# Patient Record
Sex: FEMALE | Race: WHITE | NOT HISPANIC OR LATINO | Employment: FULL TIME | URBAN - METROPOLITAN AREA
[De-identification: names, ages, dates, MRNs, and addresses within clinical notes are randomized per-mention and may not be internally consistent; named-entity substitution may affect disease eponyms.]

---

## 2021-10-15 ENCOUNTER — APPOINTMENT (OUTPATIENT)
Dept: CT IMAGING | Facility: HOSPITAL | Age: 46
End: 2021-10-15
Payer: COMMERCIAL

## 2021-10-15 ENCOUNTER — HOSPITAL ENCOUNTER (EMERGENCY)
Facility: HOSPITAL | Age: 46
Discharge: HOME | End: 2021-10-15
Attending: EMERGENCY MEDICINE
Payer: COMMERCIAL

## 2021-10-15 ENCOUNTER — APPOINTMENT (OUTPATIENT)
Dept: RADIOLOGY | Facility: HOSPITAL | Age: 46
End: 2021-10-15
Payer: COMMERCIAL

## 2021-10-15 VITALS
HEART RATE: 102 BPM | RESPIRATION RATE: 10 BRPM | OXYGEN SATURATION: 97 % | SYSTOLIC BLOOD PRESSURE: 155 MMHG | TEMPERATURE: 98.1 F | WEIGHT: 127 LBS | DIASTOLIC BLOOD PRESSURE: 100 MMHG | BODY MASS INDEX: 23.37 KG/M2 | HEIGHT: 62 IN

## 2021-10-15 DIAGNOSIS — F41.9 ANXIETY: ICD-10-CM

## 2021-10-15 DIAGNOSIS — R00.0 TACHYCARDIA: ICD-10-CM

## 2021-10-15 DIAGNOSIS — R07.9 CHEST PAIN: Primary | ICD-10-CM

## 2021-10-15 LAB
ALBUMIN SERPL-MCNC: 5.1 G/DL (ref 3.5–5.3)
ALP SERPL-CCNC: 58 U/L (ref 37–98)
ALT SERPL-CCNC: 12 U/L (ref 7–52)
ANION GAP SERPL CALC-SCNC: 11 MMOL/L (ref 3–11)
AST SERPL-CCNC: 21 U/L
BASOPHILS # BLD AUTO: 0 10*3/UL
BASOPHILS NFR BLD AUTO: 1 % (ref 0–2)
BILIRUB SERPL-MCNC: 0.58 MG/DL (ref 0.2–1.4)
BUN SERPL-MCNC: 14 MG/DL (ref 7–25)
CALCIUM ALBUM COR SERPL-MCNC: 9.4 MG/DL (ref 8.6–10.3)
CALCIUM SERPL-MCNC: 10.3 MG/DL (ref 8.6–10.3)
CHLORIDE SERPL-SCNC: 106 MMOL/L (ref 98–107)
CO2 SERPL-SCNC: 23 MMOL/L (ref 21–32)
CREAT SERPL-MCNC: 0.84 MG/DL (ref 0.6–1.1)
DELTA HIGH SENSITIVITY TROPONIN I, 1 HOUR: -5
DELTA HIGH SENSITIVITY TROPONIN I, 2 HOUR: -4.6
EOSINOPHIL # BLD AUTO: 0 10*3/UL
EOSINOPHIL NFR BLD AUTO: 1 % (ref 0–3)
ERYTHROCYTE [DISTWIDTH] IN BLOOD BY AUTOMATED COUNT: 13.4 % (ref 11.5–14)
FIBRIN D-DIMER (NG/ML) IN PLATELET POOR PLASMA: 674 NG/ML FEU
GFR SERPL CREATININE-BSD FRML MDRD: 84 ML/MIN/1.73M*2
GLUCOSE SERPL-MCNC: 107 MG/DL (ref 70–105)
HCG SERPL QL: NEGATIVE
HCT VFR BLD AUTO: 45.9 % (ref 34–45)
HGB BLD-MCNC: 15.5 G/DL (ref 11.5–15.5)
LYMPHOCYTES # BLD AUTO: 1 10*3/UL
LYMPHOCYTES NFR BLD AUTO: 17 % (ref 11–47)
MAGNESIUM SERPL-MCNC: 2.2 MG/DL (ref 1.8–2.4)
MCH RBC QN AUTO: 29.7 PG (ref 28–33)
MCHC RBC AUTO-ENTMCNC: 33.8 G/DL (ref 32–36)
MCV RBC AUTO: 87.8 FL (ref 81–97)
MONOCYTES # BLD AUTO: 0.4 10*3/UL
MONOCYTES NFR BLD AUTO: 6 % (ref 3–11)
NEUTROPHILS # BLD AUTO: 4.5 10*3/UL
NEUTROPHILS NFR BLD AUTO: 76 % (ref 41–81)
PLATELET # BLD AUTO: 251 10*3/UL (ref 140–350)
PMV BLD AUTO: 7.3 FL (ref 6.9–10.8)
POTASSIUM SERPL-SCNC: 3.7 MMOL/L (ref 3.5–5.1)
PROT SERPL-MCNC: 8.4 G/DL (ref 6–8.3)
RBC # BLD AUTO: 5.22 10*6/ΜL (ref 3.7–5.3)
SODIUM SERPL-SCNC: 140 MMOL/L (ref 135–145)
TROPONIN I SERPL-MCNC: 10.3 PG/ML
TROPONIN I SERPL-MCNC: 14.9 PG/ML
TROPONIN I SERPL-MCNC: 9.9 PG/ML
WBC # BLD AUTO: 6 10*3/UL (ref 4.5–10.5)

## 2021-10-15 PROCEDURE — 84703 CHORIONIC GONADOTROPIN ASSAY: CPT | Performed by: EMERGENCY MEDICINE

## 2021-10-15 PROCEDURE — 85379 FIBRIN DEGRADATION QUANT: CPT | Performed by: EMERGENCY MEDICINE

## 2021-10-15 PROCEDURE — 2550000100 HC RX 255: Performed by: EMERGENCY MEDICINE

## 2021-10-15 PROCEDURE — 96374 THER/PROPH/DIAG INJ IV PUSH: CPT

## 2021-10-15 PROCEDURE — 93005 ELECTROCARDIOGRAM TRACING: CPT

## 2021-10-15 PROCEDURE — 84484 ASSAY OF TROPONIN QUANT: CPT | Performed by: EMERGENCY MEDICINE

## 2021-10-15 PROCEDURE — G1004 CDSM NDSC: HCPCS

## 2021-10-15 PROCEDURE — 71045 X-RAY EXAM CHEST 1 VIEW: CPT

## 2021-10-15 PROCEDURE — 2580000300 HC RX 258: Performed by: EMERGENCY MEDICINE

## 2021-10-15 PROCEDURE — 80053 COMPREHEN METABOLIC PANEL: CPT | Performed by: EMERGENCY MEDICINE

## 2021-10-15 PROCEDURE — 85025 COMPLETE CBC W/AUTO DIFF WBC: CPT | Performed by: EMERGENCY MEDICINE

## 2021-10-15 PROCEDURE — 99284 EMERGENCY DEPT VISIT MOD MDM: CPT | Performed by: EMERGENCY MEDICINE

## 2021-10-15 PROCEDURE — 83735 ASSAY OF MAGNESIUM: CPT | Performed by: EMERGENCY MEDICINE

## 2021-10-15 PROCEDURE — 96375 TX/PRO/DX INJ NEW DRUG ADDON: CPT

## 2021-10-15 PROCEDURE — 36415 COLL VENOUS BLD VENIPUNCTURE: CPT | Performed by: EMERGENCY MEDICINE

## 2021-10-15 PROCEDURE — 6360000200 HC RX 636 W HCPCS (ALT 250 FOR IP): Performed by: EMERGENCY MEDICINE

## 2021-10-15 RX ORDER — NAPROXEN SODIUM 220 MG/1
324 TABLET, FILM COATED ORAL ONCE
Status: COMPLETED | OUTPATIENT
Start: 2021-10-15 | End: 2021-10-15

## 2021-10-15 RX ORDER — SODIUM CHLORIDE 9 MG/ML
25-50 INJECTION, SOLUTION INTRAVENOUS AS NEEDED
Status: DISCONTINUED | OUTPATIENT
Start: 2021-10-15 | End: 2021-10-15 | Stop reason: HOSPADM

## 2021-10-15 RX ORDER — PROPRANOLOL HYDROCHLORIDE 10 MG/1
40 TABLET ORAL 2 TIMES DAILY
Qty: 240 TABLET | Refills: 0 | Status: SHIPPED | OUTPATIENT
Start: 2021-10-15 | End: 2021-11-14

## 2021-10-15 RX ORDER — LABETALOL HCL 20 MG/4 ML
10 SYRINGE (ML) INTRAVENOUS ONCE
Status: COMPLETED | OUTPATIENT
Start: 2021-10-15 | End: 2021-10-15

## 2021-10-15 RX ORDER — ALPRAZOLAM 0.25 MG/1
0.25 TABLET ORAL 3 TIMES DAILY PRN
COMMUNITY

## 2021-10-15 RX ORDER — ESCITALOPRAM OXALATE 10 MG/1
10 TABLET ORAL DAILY
COMMUNITY

## 2021-10-15 RX ORDER — AMLODIPINE BESYLATE 5 MG/1
5 TABLET ORAL DAILY
COMMUNITY

## 2021-10-15 RX ORDER — LORAZEPAM 2 MG/ML
0.5 INJECTION INTRAMUSCULAR ONCE
Status: COMPLETED | OUTPATIENT
Start: 2021-10-15 | End: 2021-10-15

## 2021-10-15 RX ORDER — IOPAMIDOL 755 MG/ML
67 INJECTION, SOLUTION INTRAVASCULAR ONCE
Status: COMPLETED | OUTPATIENT
Start: 2021-10-15 | End: 2021-10-15

## 2021-10-15 RX ORDER — SODIUM CHLORIDE 9 MG/ML
1000 INJECTION, SOLUTION INTRAVENOUS ONCE
Status: COMPLETED | OUTPATIENT
Start: 2021-10-15 | End: 2021-10-15

## 2021-10-15 RX ADMIN — LORAZEPAM 0.5 MG: 2 INJECTION INTRAMUSCULAR; INTRAVENOUS at 17:37

## 2021-10-15 RX ADMIN — IOPAMIDOL 67 ML: 755 INJECTION, SOLUTION INTRAVENOUS at 18:55

## 2021-10-15 RX ADMIN — NAPROXEN SODIUM 324 MG: 220 TABLET, FILM COATED ORAL at 17:36

## 2021-10-15 RX ADMIN — SODIUM CHLORIDE 1000 ML: 9 INJECTION, SOLUTION INTRAVENOUS at 18:30

## 2021-10-15 RX ADMIN — LABETALOL HYDROCHLORIDE 10 MG: 5 INJECTION, SOLUTION INTRAVENOUS at 17:45

## 2021-10-15 ASSESSMENT — ENCOUNTER SYMPTOMS
DYSURIA: 0
SORE THROAT: 0
NAUSEA: 1
COLOR CHANGE: 0
ARTHRALGIAS: 0
ABDOMINAL PAIN: 0
COUGH: 0
VOMITING: 1
NERVOUS/ANXIOUS: 1
BACK PAIN: 0
HEMATURIA: 0
DIAPHORESIS: 1
EYE PAIN: 0
SEIZURES: 0
PALPITATIONS: 1
FEVER: 0
SHORTNESS OF BREATH: 1
CHILLS: 1

## 2021-10-15 ASSESSMENT — HEART SCORE
TROPONIN: LESS THAN OR EQUAL TO NORMAL LIMIT
HEART SCORE: 3
RISK FACTORS: 1-2 RISK FACTORS
HISTORY: MODERATELY SUSPICIOUS
AGE: 45-64
ECG: NORMAL

## 2021-10-15 NOTE — ED PROVIDER NOTES
HPI:  Chief Complaint   Patient presents with   • Chest Pain     PT arrives to ED reports she has recently started taking new blood pressure meds. Reports sudden onset N/V, flushed, clammy, chills, arms tinggling w/in last hour.      HPI    Patient is a 46 year old female presenting with a chief complaint of palpitations and shortness of breath that began suddenly 2 hours ago. She feels as though her heart is racing. She reports burning chest pain and states both of her arms feel numb and tingly. After her chest pain began she then felt nauseous and vomited once. She felt improved after this. She reports chills and diaphoresis as well.    Her father had a cardiac stent placed when he was in his early 60s. Both of her parents have a history of hypertension. 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 She drove here from [place] today and is scheduled to go home in 6 days. She states she was switched from Losartan to 5 mg Amlodipine a few days ago. She reports her provider was initially thinking about placing her on Propanolol, but ultimately decided on Amlodipine. She states her blood pressure have been running in the 180s/110s, but are usually in the 160s/90s when she checks in the morning. She reports she was also placed on Lexapro 3 days ago because she has been anxious about her blood pressure being high. She denies a medical history other than hypertension and anxiety. She states she took half of a tablet of Xanax just prior to coming here for anxiety. She states she was taken off her birth control that contained Estrogen and was placed on another medication with Progesterone only.        HISTORY:  Past Medical History:   Diagnosis Date   • Anxiety    • Hypertension      Past Surgical History:  No pertinent past surgical history.    No family history on file.    Social History     Tobacco Use   • Smoking status: Not on file   Substance Use Topics   • Alcohol use: Not on file   • Drug use: Not on file        ROS:  Review of Systems   Constitutional: Positive for chills and diaphoresis. Negative for fever.   HENT: Negative for ear pain and sore throat.    Eyes: Negative for pain and visual disturbance.   Respiratory: Positive for shortness of breath. Negative for cough.    Cardiovascular: Positive for chest pain and palpitations.   Gastrointestinal: Positive for nausea and vomiting. Negative for abdominal pain.   Genitourinary: Negative for dysuria and hematuria.   Musculoskeletal: Negative for arthralgias and back pain.   Skin: Negative for color change and rash.   Neurological: Negative for seizures and syncope.   Psychiatric/Behavioral: The patient is nervous/anxious.    All other systems reviewed and are negative.       PHYSICAL EXAM:  Physical Exam  Vitals and nursing note reviewed.   Constitutional:       General: She is not in acute distress.     Appearance: She is well-developed.   HENT:      Head: Normocephalic and atraumatic.   Eyes:      Conjunctiva/sclera: Conjunctivae normal.   Cardiovascular:      Rate and Rhythm: Regular rhythm. Tachycardia present.      Heart sounds: No murmur heard.     Pulmonary:      Effort: Pulmonary effort is normal. No respiratory distress.      Breath sounds: Normal breath sounds.   Abdominal:      Palpations: Abdomen is soft.      Tenderness: There is no abdominal tenderness.   Musculoskeletal:      Cervical back: Neck supple.   Skin:     General: Skin is warm and dry.   Neurological:      Mental Status: She is alert.   Psychiatric:         Mood and Affect: Mood is anxious.          Results for orders placed or performed during the hospital encounter of 10/15/21   CBC w/auto differential Blood, Venous   Result Value Ref Range    WBC 6.0 4.5 - 10.5 10*3/uL    RBC 5.22 3.70 - 5.30 10*6/µL    Hemoglobin 15.5 11.5 - 15.5 g/dL    Hematocrit 45.9 (H) 34.0 - 45.0 %    MCV 87.8 81.0 - 97.0 fL    MCH 29.7 28.0 - 33.0 pg    MCHC 33.8 32.0 - 36.0 g/dL    RDW 13.4 11.5 - 14.0 %     Platelets 251 140 - 350 10*3/uL    MPV 7.3 6.9 - 10.8 fL    Neutrophils% 76 41 - 81 %    Lymphocytes% 17 11 - 47 %    Monocytes% 6 3 - 11 %    Eosinophils% 1 0 - 3 %    Basophils% 1 0 - 2 %    ANC (auto diff) 4.50 10*3/UL    Lymphocytes Absolute 1.00 10*3/uL    Monocytes Absolute 0.40 10*3/uL    Eosinophils Absolute 0.00 10*3/uL    Basophils Absolute 0.00 10*3/uL   Comprehensive metabolic panel Blood, Venous   Result Value Ref Range    Sodium 140 135 - 145 mmol/L    Potassium 3.7 3.5 - 5.1 mmol/L    Chloride 106 98 - 107 mmol/L    CO2 23 21 - 32 mmol/L    Anion Gap 11 3 - 11 mmol/L    BUN 14 7 - 25 mg/dL    Creatinine 0.84 0.60 - 1.10 mg/dL    Glucose 107 (H) 70 - 105 mg/dL    Calcium 10.3 8.6 - 10.3 mg/dL    AST 21 <40 U/L    ALT (SGPT) 12 7 - 52 U/L    Alkaline Phosphatase 58 37 - 98 U/L    Total Protein 8.4 (H) 6.0 - 8.3 g/dL    Albumin 5.1 3.5 - 5.3 g/dL    Total Bilirubin 0.58 0.20 - 1.40 mg/dL    eGFR 84 >60 mL/min/1.73m*2    Corrected Calcium 9.4 8.6 - 10.3 mg/dL   D-dimer, quantitative Blood, Venous   Result Value Ref Range    D-Dimer, Quant (ng/mL) 674 (H) <501 ng/mL FEU   HS Troponin I   Result Value Ref Range    hsTnI 0 Hour 14.9 <=14.9 pg/mL   Magnesium Blood, Venous   Result Value Ref Range    Magnesium 2.2 1.8 - 2.4 mg/dL   1HR High Sensitive Trop I   Result Value Ref Range    hsTnI 1 hr 9.9 <=14.9 pg/mL    Delta from 0 Hour -5 -11.9 to 11.9   2HR High Sensitive Troponin I   Result Value Ref Range    Delta from 0 Hour -4.6 -14.9 to 14.9    hsTnI 2 hr 10.3 <=14.9 pg/mL   HCG, rapid qualitative, serum   Result Value Ref Range    HCG qualitative Negative        MDM:     46 year old female with history of recent drive from [place] presenting with chest pain, palpitations, and shortness of breath. She was given 324 mg Aspirin, 0.5 mg Ativan, and IV fluids here. She was also given 10 mg IV Labetalol for hypertension. Chest x-ray shows no acute disease. D-dimer was elevated so a CTA chest was obtained which  is negative for pulmonary embolism. Initial, 1 hour, and 2 hour troponins are normal. She responded well to beta blockade here. No evidence of NSTEMI, STEMI, aortic dissection, pulmonary embolism, or cardiac tamponade. Results were discussed with the patient. A prescription for Propanolol was provided. Instructed patient to stop taking the Amlodipine and start taking Propanolol tomorrow until she can follow up with her primary care provider in [place] via telephone on Monday.. She was discharged in stable condition and advised to see their primary care provider or return to the emergency department if their symptoms worsen or new symptoms develop.        HEART Score: 3      Labs Reviewed   CBC WITH AUTO DIFFERENTIAL - Abnormal       Result Value    WBC 6.0      RBC 5.22      Hemoglobin 15.5      Hematocrit 45.9 (*)     MCV 87.8      MCH 29.7      MCHC 33.8      RDW 13.4      Platelets 251      MPV 7.3      Neutrophils% 76      Lymphocytes% 17      Monocytes% 6      Eosinophils% 1      Basophils% 1      ANC (auto diff) 4.50      Lymphocytes Absolute 1.00      Monocytes Absolute 0.40      Eosinophils Absolute 0.00      Basophils Absolute 0.00     COMPREHENSIVE METABOLIC PANEL - Abnormal    Sodium 140      Potassium 3.7      Chloride 106      CO2 23      Anion Gap 11      BUN 14      Creatinine 0.84      Glucose 107 (*)     Calcium 10.3      AST 21      ALT (SGPT) 12      Alkaline Phosphatase 58      Total Protein 8.4 (*)     Albumin 5.1      Total Bilirubin 0.58      eGFR 84      Corrected Calcium 9.4      Narrative:     Estimated GFR calculated using the 2009 CKD-EPI creatinine equation.   D-DIMER, QUANTITATIVE - Abnormal    D-Dimer, Quant (ng/mL) 674 (*)     Narrative:     D-dimer values < or =500 ng/mL FEU may be used in conjunction with clinical pretest probability to exclude deep vein thrombosis (DVT) and pulmonary embolism (PE).   HIGH SENSITIVE TROPONIN I - Normal    hsTnI 0 Hour 14.9     MAGNESIUM - Normal     Magnesium 2.2     HIGH SENSITIVE TROPONIN I, 1 HOUR - Normal    hsTnI 1 hr 9.9      Delta from 0 Hour -5     HIGH SENSITIVE TROPONIN I, 2 HOUR - Normal    Delta from 0 Hour -4.6      hsTnI 2 hr 10.3     HIGH SENSITIVE TROPONIN I PANEL (0HR, 1HR, 2HR)    Narrative:     The following orders were created for panel order HS Troponin I Panel (0HR, 1HR, 2HR) Blood, Venous.  Procedure                               Abnormality         Status                     ---------                               -----------         ------                     HS Troponin I[[id number]]                 Normal              Final result               1HR High Sensitive Trop I[[id number]]     Normal              Final result               2HR High Sensitive Tropon...[[id number]]  Normal              Final result                 Please view results for these tests on the individual orders.   HCG, RAPID QUALITATIVE, SERUM    HCG qualitative Negative         CT angiogram chest PE with IV contrast   Final Result   IMPRESSION:   Negative CTA of the chest.      XR chest portable 1 view   Final Result   IMPRESSION:   1.  No acute disease.          ED Medication Administration from 10/15/2021 1545 to 10/15/2021 2106       Date/Time Order Dose Route Action Action by     10/15/2021 1745 labetaloL (NORMODYNE,TRANDATE) injection 10 mg 10 mg intravenous Given De Luna, R     10/15/2021 1737 LORazepam (ATIVAN) injection 0.5 mg 0.5 mg intravenous Given De Luna, R     10/15/2021 1736 aspirin chewable tablet 324 mg 324 mg oral Given De Luna, R     10/15/2021 1830 sodium chloride 0.9 % bolus 1,000 mL 1,000 mL intravenous New Bag/New Syringe MARY Cameron     10/15/2021 1930 sodium chloride 0.9 % bolus 1,000 mL 0 mL intravenous Stopped Saira A     10/15/2021 1855 iopamidoL (ISOVUE-370) 76 % injection 67 mL 67 mL intravenous Given JASON Erickson          PROCEDURES:  Procedures    ED COURSE:     Recent Results (from the past 24 hour(s))   HCG, rapid qualitative,  serum    Collection Time: 10/15/21  5:38 PM   Result Value Ref Range    HCG qualitative Negative    CBC w/auto differential Blood, Venous    Collection Time: 10/15/21  5:40 PM   Result Value Ref Range    WBC 6.0 4.5 - 10.5 10*3/uL    RBC 5.22 3.70 - 5.30 10*6/µL    Hemoglobin 15.5 11.5 - 15.5 g/dL    Hematocrit 45.9 (H) 34.0 - 45.0 %    MCV 87.8 81.0 - 97.0 fL    MCH 29.7 28.0 - 33.0 pg    MCHC 33.8 32.0 - 36.0 g/dL    RDW 13.4 11.5 - 14.0 %    Platelets 251 140 - 350 10*3/uL    MPV 7.3 6.9 - 10.8 fL    Neutrophils% 76 41 - 81 %    Lymphocytes% 17 11 - 47 %    Monocytes% 6 3 - 11 %    Eosinophils% 1 0 - 3 %    Basophils% 1 0 - 2 %    ANC (auto diff) 4.50 10*3/UL    Lymphocytes Absolute 1.00 10*3/uL    Monocytes Absolute 0.40 10*3/uL    Eosinophils Absolute 0.00 10*3/uL    Basophils Absolute 0.00 10*3/uL   Comprehensive metabolic panel Blood, Venous    Collection Time: 10/15/21  5:40 PM   Result Value Ref Range    Sodium 140 135 - 145 mmol/L    Potassium 3.7 3.5 - 5.1 mmol/L    Chloride 106 98 - 107 mmol/L    CO2 23 21 - 32 mmol/L    Anion Gap 11 3 - 11 mmol/L    BUN 14 7 - 25 mg/dL    Creatinine 0.84 0.60 - 1.10 mg/dL    Glucose 107 (H) 70 - 105 mg/dL    Calcium 10.3 8.6 - 10.3 mg/dL    AST 21 <40 U/L    ALT (SGPT) 12 7 - 52 U/L    Alkaline Phosphatase 58 37 - 98 U/L    Total Protein 8.4 (H) 6.0 - 8.3 g/dL    Albumin 5.1 3.5 - 5.3 g/dL    Total Bilirubin 0.58 0.20 - 1.40 mg/dL    eGFR 84 >60 mL/min/1.73m*2    Corrected Calcium 9.4 8.6 - 10.3 mg/dL   D-dimer, quantitative Blood, Venous    Collection Time: 10/15/21  5:40 PM   Result Value Ref Range    D-Dimer, Quant (ng/mL) 674 (H) <501 ng/mL FEU   HS Troponin I    Collection Time: 10/15/21  5:40 PM   Result Value Ref Range    hsTnI 0 Hour 14.9 <=14.9 pg/mL   Magnesium Blood, Venous    Collection Time: 10/15/21  5:40 PM   Result Value Ref Range    Magnesium 2.2 1.8 - 2.4 mg/dL   1HR High Sensitive Trop I    Collection Time: 10/15/21  6:47 PM   Result Value Ref Range     hsTnI 1 hr 9.9 <=14.9 pg/mL    Delta from 0 Hour -5 -11.9 to 11.9   2HR High Sensitive Troponin I    Collection Time: 10/15/21  7:45 PM   Result Value Ref Range    Delta from 0 Hour -4.6 -14.9 to 14.9    hsTnI 2 hr 10.3 <=14.9 pg/mL     HEART Score: 3          CLINICAL IMPRESSION:  Final diagnoses:   [R07.9] Chest pain   [R00.0] Tachycardia   [F41.9] Anxiety         By signing my name, I, Dhaval Nguyễn attest that this documentation has been prepared under the direction and in the presence of Dr. Godwin  10/15/2021, 5:16 PM.    I, Dr. Godwin, personally performed the services described in this documentation as described by juliann in my presence and it is both accurate and complete.     A voice recognition program was used to aid in documentation of this record.  Sometimes words are not printed exactly as they were spoken.  While efforts were made to carefully edit and correct any inaccuracies, some areas may be present; please take these into context.  Please contact the provider if areas are identified.       Sheldon Godwin, DO  10/15/21 2739

## 2021-10-15 NOTE — ED PROCEDURE NOTE
Procedure  ECG 12 lead - Shortness of breath    Date/Time: 10/15/2021 5:26 PM  Performed by: Sheldon Godwin DO  Authorized by: Sheldon Godwin DO     Comments:      Sinus tach, rate 104, , QRS 80, QTc 461, normal axis, no acute ST depression elevation no comparison EKG.                 Sheldon Godwin DO  10/15/21 [id number]

## 2021-10-16 NOTE — DISCHARGE INSTRUCTIONS
Start the propanolol tomorrow hold your amlodipine.  Discussed with  To advance the dose as needed.  If over the weekend you have worsening symptoms return the emergency department for reevaluation.

## 2022-03-26 PROCEDURE — PSEU10635 2019 NOVEL CORONAVIRUS (SARS-COV-2): Performed by: CLINICAL MEDICAL LABORATORY

## 2022-03-26 PROCEDURE — U0005 INFEC AGEN DETEC AMPLI PROBE: HCPCS | Performed by: CLINICAL MEDICAL LABORATORY

## 2022-03-26 PROCEDURE — U0003 INFECTIOUS AGENT DETECTION BY NUCLEIC ACID (DNA OR RNA); SEVERE ACUTE RESPIRATORY SYNDROME CORONAVIRUS 2 (SARS-COV-2) (CORONAVIRUS DISEASE [COVID-19]), AMPLIFIED PROBE TECHNIQUE, MAKING USE OF HIGH THROUGHPUT TECHNOLOGIES AS DESCRIBED BY CMS-2020-01-R: HCPCS | Performed by: CLINICAL MEDICAL LABORATORY

## 2022-03-27 ENCOUNTER — LAB REQUISITION (OUTPATIENT)
Dept: LAB | Age: 47
End: 2022-03-27

## 2022-03-27 DIAGNOSIS — N26.1 ATROPHY OF KIDNEY (TERMINAL): ICD-10-CM

## 2022-03-27 DIAGNOSIS — I10 ESSENTIAL (PRIMARY) HYPERTENSION: ICD-10-CM

## 2022-03-28 LAB
FLUAV RNA RESP QL NAA+PROBE: NOT DETECTED
FLUBV RNA RESP QL NAA+PROBE: NOT DETECTED
SARS-COV-2 RNA RESP QL NAA+PROBE: NOT DETECTED
SERVICE CMNT-IMP: NORMAL
SERVICE CMNT-IMP: NORMAL